# Patient Record
Sex: MALE | Race: WHITE | Employment: FULL TIME | URBAN - METROPOLITAN AREA
[De-identification: names, ages, dates, MRNs, and addresses within clinical notes are randomized per-mention and may not be internally consistent; named-entity substitution may affect disease eponyms.]

---

## 2023-01-19 ENCOUNTER — HOSPITAL ENCOUNTER (OUTPATIENT)
Age: 28
Setting detail: OUTPATIENT SURGERY
Discharge: HOME | End: 2023-01-19
Attending: INTERNAL MEDICINE | Admitting: INTERNAL MEDICINE
Payer: COMMERCIAL

## 2023-01-19 ENCOUNTER — ANESTHESIA (OUTPATIENT)
Dept: ENDOSCOPY | Age: 28
End: 2023-01-19
Payer: COMMERCIAL

## 2023-01-19 ENCOUNTER — ANESTHESIA EVENT (OUTPATIENT)
Dept: ENDOSCOPY | Age: 28
End: 2023-01-19
Payer: COMMERCIAL

## 2023-01-19 VITALS
SYSTOLIC BLOOD PRESSURE: 121 MMHG | RESPIRATION RATE: 16 BRPM | DIASTOLIC BLOOD PRESSURE: 74 MMHG | OXYGEN SATURATION: 98 % | HEART RATE: 76 BPM | HEIGHT: 76 IN | TEMPERATURE: 97.8 F | WEIGHT: 165 LBS | BODY MASS INDEX: 20.09 KG/M2

## 2023-01-19 DIAGNOSIS — R11.2 NAUSEA AND VOMITING, UNSPECIFIED VOMITING TYPE: ICD-10-CM

## 2023-01-19 PROCEDURE — 88342 IMHCHEM/IMCYTCHM 1ST ANTB: CPT

## 2023-01-19 PROCEDURE — 2580000003 HC RX 258: Performed by: FAMILY MEDICINE

## 2023-01-19 PROCEDURE — 3700000001 HC ADD 15 MINUTES (ANESTHESIA): Performed by: INTERNAL MEDICINE

## 2023-01-19 PROCEDURE — 3609012400 HC EGD TRANSORAL BIOPSY SINGLE/MULTIPLE: Performed by: INTERNAL MEDICINE

## 2023-01-19 PROCEDURE — 2580000003 HC RX 258: Performed by: NURSE ANESTHETIST, CERTIFIED REGISTERED

## 2023-01-19 PROCEDURE — 7100000010 HC PHASE II RECOVERY - FIRST 15 MIN: Performed by: INTERNAL MEDICINE

## 2023-01-19 PROCEDURE — 88312 SPECIAL STAINS GROUP 1: CPT

## 2023-01-19 PROCEDURE — 88305 TISSUE EXAM BY PATHOLOGIST: CPT

## 2023-01-19 PROCEDURE — 6360000002 HC RX W HCPCS: Performed by: NURSE ANESTHETIST, CERTIFIED REGISTERED

## 2023-01-19 PROCEDURE — 7100000011 HC PHASE II RECOVERY - ADDTL 15 MIN: Performed by: INTERNAL MEDICINE

## 2023-01-19 PROCEDURE — 3700000000 HC ANESTHESIA ATTENDED CARE: Performed by: INTERNAL MEDICINE

## 2023-01-19 PROCEDURE — 2500000003 HC RX 250 WO HCPCS: Performed by: NURSE ANESTHETIST, CERTIFIED REGISTERED

## 2023-01-19 PROCEDURE — 2709999900 HC NON-CHARGEABLE SUPPLY: Performed by: INTERNAL MEDICINE

## 2023-01-19 RX ORDER — LIDOCAINE HYDROCHLORIDE 20 MG/ML
INJECTION, SOLUTION EPIDURAL; INFILTRATION; INTRACAUDAL; PERINEURAL PRN
Status: DISCONTINUED | OUTPATIENT
Start: 2023-01-19 | End: 2023-01-19 | Stop reason: SDUPTHER

## 2023-01-19 RX ORDER — ACETAMINOPHEN 325 MG/1
650 TABLET ORAL EVERY 6 HOURS PRN
COMMUNITY

## 2023-01-19 RX ORDER — ONDANSETRON 2 MG/ML
INJECTION INTRAMUSCULAR; INTRAVENOUS PRN
Status: DISCONTINUED | OUTPATIENT
Start: 2023-01-19 | End: 2023-01-19 | Stop reason: SDUPTHER

## 2023-01-19 RX ORDER — DICYCLOMINE HCL 20 MG
20 TABLET ORAL EVERY 6 HOURS
COMMUNITY

## 2023-01-19 RX ORDER — SODIUM CHLORIDE, SODIUM LACTATE, POTASSIUM CHLORIDE, CALCIUM CHLORIDE 600; 310; 30; 20 MG/100ML; MG/100ML; MG/100ML; MG/100ML
INJECTION, SOLUTION INTRAVENOUS CONTINUOUS PRN
Status: DISCONTINUED | OUTPATIENT
Start: 2023-01-19 | End: 2023-01-19 | Stop reason: SDUPTHER

## 2023-01-19 RX ORDER — SODIUM CHLORIDE, SODIUM LACTATE, POTASSIUM CHLORIDE, CALCIUM CHLORIDE 600; 310; 30; 20 MG/100ML; MG/100ML; MG/100ML; MG/100ML
INJECTION, SOLUTION INTRAVENOUS CONTINUOUS
Status: DISCONTINUED | OUTPATIENT
Start: 2023-01-19 | End: 2023-01-19 | Stop reason: HOSPADM

## 2023-01-19 RX ORDER — PROPOFOL 10 MG/ML
INJECTION, EMULSION INTRAVENOUS PRN
Status: DISCONTINUED | OUTPATIENT
Start: 2023-01-19 | End: 2023-01-19 | Stop reason: SDUPTHER

## 2023-01-19 RX ADMIN — SODIUM CHLORIDE, POTASSIUM CHLORIDE, SODIUM LACTATE AND CALCIUM CHLORIDE: 600; 310; 30; 20 INJECTION, SOLUTION INTRAVENOUS at 11:58

## 2023-01-19 RX ADMIN — PROPOFOL 100 MG: 10 INJECTION, EMULSION INTRAVENOUS at 12:24

## 2023-01-19 RX ADMIN — PROPOFOL 100 MG: 10 INJECTION, EMULSION INTRAVENOUS at 12:22

## 2023-01-19 RX ADMIN — ONDANSETRON 4 MG: 2 INJECTION INTRAMUSCULAR; INTRAVENOUS at 12:18

## 2023-01-19 RX ADMIN — PROPOFOL 100 MG: 10 INJECTION, EMULSION INTRAVENOUS at 12:26

## 2023-01-19 RX ADMIN — PROPOFOL 100 MG: 10 INJECTION, EMULSION INTRAVENOUS at 12:21

## 2023-01-19 RX ADMIN — LIDOCAINE HYDROCHLORIDE 100 MG: 20 INJECTION, SOLUTION EPIDURAL; INFILTRATION; INTRACAUDAL; PERINEURAL at 12:21

## 2023-01-19 RX ADMIN — SODIUM CHLORIDE, SODIUM LACTATE, POTASSIUM CHLORIDE, AND CALCIUM CHLORIDE: .6; .31; .03; .02 INJECTION, SOLUTION INTRAVENOUS at 12:15

## 2023-01-19 ASSESSMENT — PAIN - FUNCTIONAL ASSESSMENT: PAIN_FUNCTIONAL_ASSESSMENT: 0-10

## 2023-01-19 ASSESSMENT — PAIN SCALES - GENERAL: PAINLEVEL_OUTOF10: 0

## 2023-01-19 NOTE — PROGRESS NOTES
Sleepy on return from EGD. Dr Satish Villarreal with parents for at least 16 mins explaining procedure and next steps.

## 2023-01-19 NOTE — ANESTHESIA PRE PROCEDURE
Department of Anesthesiology  Preprocedure Note       Name:  Mark Hernández   Age:  32 y.o.  :  1995                                          MRN:  [id number]         Date:  2023      Surgeon: Jamila Maurice):  Kwame Pacheco MD    Procedure: Procedure(s):  EGD BIOPSY    Medications prior to admission:   Prior to Admission medications    Medication Sig Start Date End Date Taking? Authorizing Provider   PANTOPRAZOLE SODIUM PO Take 40 mg by mouth daily   Yes Historical Provider, MD   acetaminophen (TYLENOL) 325 MG tablet Take 650 mg by mouth every 6 hours as needed for Pain 2 tabs   Yes Historical Provider, MD   dicyclomine (BENTYL) 20 MG tablet Take 20 mg by mouth in the morning and 20 mg at noon and 20 mg in the evening and 20 mg before bedtime. Yes Historical Provider, MD       Current medications:    Current Facility-Administered Medications   Medication Dose Route Frequency Provider Last Rate Last Admin    lactated ringers infusion   IntraVENous Continuous Claire Mir  mL/hr at 23 1158 New Bag at 23 1158       Allergies:  No Known Allergies    Problem List:  There is no problem list on file for this patient.       Past Medical History:        Diagnosis Date    Prolonged emergence from general anesthesia     post appendectomy    Retention of urine     after appendectomy- required cather       Past Surgical History:        Procedure Laterality Date    APPENDECTOMY  2023    CYST REMOVAL Right 10/17/2016    Excision nevis R Chest, Sebaceous cyst scrotum       Social History:    Social History     Tobacco Use    Smoking status: Some Days     Packs/day: 0.33     Types: Cigarettes    Smokeless tobacco: Not on file    Tobacco comments:     2 packs/week   Substance Use Topics    Alcohol use: Not Currently                                Ready to quit: Yes  Counseling given: Yes  Tobacco comments: 2 packs/week      Vital Signs (Current):   Vitals:    23 1250 23 1300 01/19/23 1310 01/19/23 1320   BP: 111/69 120/73 123/75 121/74   Pulse: 86 82 79 76   Resp: 16 16 16 16   Temp:       TempSrc:       SpO2: 100% 98% 98% 98%   Weight:       Height:                                                  BP Readings from Last 3 Encounters:   01/19/23 121/74   10/17/16 132/73       NPO Status: Time of last liquid consumption: 2230                        Time of last solid consumption: 1200                        Date of last liquid consumption: 01/18/23                        Date of last solid food consumption: 01/18/23    BMI:   Wt Readings from Last 3 Encounters:   01/19/23 165 lb (74.8 kg)   10/17/16 180 lb (81.6 kg)     Body mass index is 20.08 kg/m². CBC: No results found for: WBC, RBC, HGB, HCT, MCV, RDW, PLT    CMP: No results found for: NA, K, CL, CO2, BUN, CREATININE, GFRAA, AGRATIO, LABGLOM, GLUCOSE, GLU, PROT, CALCIUM, BILITOT, ALKPHOS, AST, ALT    POC Tests: No results for input(s): POCGLU, POCNA, POCK, POCCL, POCBUN, POCHEMO, POCHCT in the last 72 hours. Coags: No results found for: PROTIME, INR, APTT    HCG (If Applicable): No results found for: PREGTESTUR, PREGSERUM, HCG, HCGQUANT     ABGs: No results found for: PHART, PO2ART, FFH1HFY, DXG2TUR, BEART, S3XKXCVX     Type & Screen (If Applicable):  No results found for: LABABO, LABRH    Drug/Infectious Status (If Applicable):  No results found for: HIV, HEPCAB    COVID-19 Screening (If Applicable): No results found for: COVID19        Anesthesia Evaluation    Airway: Mallampati: II  TM distance: >3 FB   Neck ROM: full  Mouth opening: > = 3 FB   Dental:          Pulmonary:                              Cardiovascular:            Rhythm: regular  Rate: normal                    Neuro/Psych:               GI/Hepatic/Renal:             Endo/Other:                     Abdominal:             Vascular: Other Findings:           Anesthesia Plan      MAC     ASA 2       Induction: intravenous.       Anesthetic plan and risks discussed with patient. Plan discussed with CRNA.                     Bhumika Tilley MD   1/19/2023

## 2023-01-19 NOTE — DISCHARGE INSTRUCTIONS
ENDOSCOPY DISCHARGE INSTRUCTIONS:    Call the physician that did your procedure for any questions or concern:    [facility]: [phone]  DR. CATALINA CEDENO      ACTIVITY:    There are potential side effects to the medications used for sedation and anesthesia during your procedure. These include:  Dizziness or light-headedness, confusion or memory loss, delayed reaction times, loss of coordination, nausea and vomiting. Because of your increased risk for injury, we ask that you observe the following precautions: For the next 24 hours,  DO NOT operate an automobile, bicycle, motorcycle, , power tools or large equipment of any kind. Do not drink alcohol, sign any legal documents or make any legal decisions for 24 hours. Do not bend your head over lower than your heart. DO sit on the side of bed/couch awhile before getting up. Plan on bedrest or quiet relaxation today. You may resume normal activities in 24 hours. DIET:    Your first meal today should be light, avoiding spicy and fatty foods. If you tolerate this first meal, then you may advance to your regular diet unless otherwise advised by your physician. NORMAL SYMPTOMS:  -Mild sore throat if youve had an EGD   -Gaseous discomfort    NOTIFY YOUR PHYSICIAN IF THESE SYMPTOMS OCCUR:  1. Fever (greater than 100)  5. Increased abdominal bloating  2. Severe pain    6. Excessive bleeding  3. Nausea and vomiting  7. Chest pain                                                                    4. Chills    8. Shortness of breath    ADDITIONAL INSTRUCTIONS:    Biopsy results: Call [phone],[facility] for biopsy results in 1 week    Educational Information:    Findings: The esophagus is notable for 2 small erosions biopsied. The junction, squamocolumnar line and diaphragm are all at the same level. In the stomach, the cardia, fundus and gastric body are essentially unremarkable. In the antrum, there were scattered small erosions without any bleeding. Biopsies were obtained from the antrum, incisura and lesser and greater curvature of the gastric body to rule out H. pylori and gastric Crohn's disease. In the duodenum, the first and second portions were unremarkable. Biopsies were obtained from the second portion to rule out celiac sprue and occult IBD. PLAN:    -Continue plan as outlined yesterday:  -Protonix daily before breakfast, Librax 3 times a day, Zofran ODT  -Colonoscopy likely on January 31, which is essentially the earliest we can perform it safely after appendectomy.  -awaiting stool studies         Please review these discharge instructions this evening or tomorrow for  information you may have forgotten. We want to thank you for choosing the [facility] as your health care provider. We always strive to provide you with excellent care while you are here. You may receive a survey in the mail regarding your care. We would appreciate you taking a few minutes of your time to complete this survey.

## 2023-01-19 NOTE — PROGRESS NOTES
Discharge instructions reviewed with patient/responsible adult and understanding verbalized. Discharge instructions signed and copies given. Patient discharged home with belongings. Pt left amb. after voiding to go home with his wife. No c/o . Chelsey Gonzalez Wife took DC paper home  after signing it and reviewed.

## 2023-01-19 NOTE — PROCEDURES
EGD PROCEDURE NOTE         Esophagogastroduodenoscopy Procedure Note     Patient: Sharyle Batter MRN: [id number]   YOB: 1995 Age: 32 y.o. Sex: male   Unit: [address] [place] [address] ENDOSCOPY Room/Bed: Endo Pool/NONE Location: [address] [place] [address]    Admitting Physician: Preston Varma     Primary Care Physician: Silvano Najera MD      DATE OF PROCEDURE: 1/19/2023  PROCEDURE: Esophagogastroduodenoscopy  INDICATION:    Diagnostic EGD for nausea vomiting, subacute mid and lower abdominal pain, diarrhea. 79-year-old man with 4 to 6 weeks of what started off his mid and lower abdominal pain, diarrhea, intractable nausea vomiting 1/8/2023, went to [facility] ER, CT scan suggested mild early acute appendicitis, and appendectomy was performed, with similar findings. Pathology suggested some chronic architectural changes on the mucosa of the appendix, raising specter of IBD. Blood work so far including CBC, CMP, lipase on several occasions unremarkable. CRP was 55. Stool infectious studies initially unremarkable. In the last 2 weeks since appendectomy, he has had overall less pain, but is having severe cramping lower abdominal pain prior to bowel movement, then watery diarrhea with small amounts of red color. Based on the shift to diarrhea, repeat stool infectious inflammatory studies were ordered yesterday. I did call his surgeon to discuss the soonest we can do colonoscopy because of the appendectomy, and the consensus was 3 weeks is the minimum, which would be January 31, 2023. We are trying to work on scheduling that. We can however proceed with an EGD for now. C diff yesterday neg. All other stool studies pending.     ENDOSCOPIST: Daniella Dacosta MD    POSTOPERATIVE DIAGNOSIS:    -Esophagus with 2 tiny erosions in the distal and mid esophagus, biopsied with intention of ruling out Crohn's.  -Stomach with scattered small erosions in the antrum, consistent with mild ulcerative antritis, biopsied in the antrum, incisura, lesser and greater curvature to rule out H. pylori and also gastric Crohn's  -Duodenum unremarkable to the distal second portion, with biopsies x8 in the second portion to rule out celiac sprue and occult IBD. These findings by themselves do not fully explain his symptoms but they may be part of the puzzle. PLAN:    -Continue plan as outlined yesterday:  -Protonix daily before breakfast, Librax 3 times a day, Zofran ODT  -Colonoscopy likely on January 31, which is essentially the earliest we can perform it safely after appendectomy.  -awaiting stool studies    INFORMED CONSENT:  Informed consent for esophagogastoduodenoscopy was obtained. The benefits and risks including adverse medicine reaction have been explained. The patient's questions were answered and the patient agreed to proceed. ASA:  ASA 2 - Patient with mild systemic disease with no functional limitations    SEDATION: MAC     The patient's vital signs, cardiac status, pulmonary status, abdominal status and mental status were stable for the procedure. The patient's vitals signs and respiratory function as monitored by oxygen saturation were stable throughout    Procedure Details: The Olympus videoendoscope was inserted into the mouth and carefully passed into the esophagus, through the stomach and to the distal duodenum. Antegrade and retrograde examination of the upper gi tract was carefully performed. Findings: The esophagus is notable for 2 small erosions biopsied. The junction, squamocolumnar line and diaphragm are all at the same level. In the stomach, the cardia, fundus and gastric body are essentially unremarkable. In the antrum, there were scattered small erosions without any bleeding. Biopsies were obtained from the antrum, incisura and lesser and greater curvature of the gastric body to rule out H. pylori and gastric Crohn's disease. In the duodenum, the first and second portions were unremarkable. Biopsies were obtained from the second portion to rule out celiac sprue and occult IBD.     Gastric or Duodenal ulcer present: No    Estimated Blood Loss: Minimal      Signed By: Erik Esparza MD

## 2023-01-19 NOTE — ANESTHESIA POSTPROCEDURE EVALUATION
Department of Anesthesiology  Postprocedure Note    Patient: Bari Skiff  MRN: [id number]  YOB: 1995  Date of evaluation: 1/19/2023      Procedure Summary     Date: 01/19/23 Room / Location: [place] [address] / [facility]    Anesthesia Start: 1215 Anesthesia Stop: 7631    Procedure: EGD BIOPSY Diagnosis:       Nausea and vomiting, unspecified vomiting type      (Nausea and vomiting, unspecified vomiting type [R11.2])    Surgeons: Usha Lombardi MD Responsible Provider: Jimmy Norwood MD    Anesthesia Type: MAC ASA Status: 2          Anesthesia Type: No value filed.     Romaine Phase I: Romaine Score: 10    Romaine Phase II: Romaine Score: 8      Anesthesia Post Evaluation    Patient location during evaluation: PACU  Level of consciousness: awake  Complications: no  Multimodal analgesia pain management approach

## 2023-01-19 NOTE — H&P
Gastroenterology Outpatient History and Physical     Patient: Yuki Lau MRN: [id number] Sex: male   YOB: 1995 Age: 32 y.o. Location: [address] [place] [address]    Date:1/19/2023  Primary Care Physician: Joann Berumen MD         Patient: Yuki [facility]    Physician: Laurent Segundo MD    History of Present Illness: Diagnostic EGD for nausea vomiting, subacute mid and lower abdominal pain, diarrhea. 26-year-old man with 4 to 6 weeks of what started off his mid and lower abdominal pain, diarrhea, intractable nausea vomiting 1/8/2023, went to [facility] ER, CT scan suggested mild early acute appendicitis, and appendectomy was performed, with similar findings. Pathology suggested some chronic architectural changes on the mucosa of the appendix, raising specter of IBD. Blood work so far including CBC, CMP, lipase on several occasions unremarkable. CRP was 55. Stool infectious studies initially unremarkable. In the last 2 weeks since appendectomy, he has had overall less pain, but is having severe cramping lower abdominal pain prior to bowel movement, then watery diarrhea with small amounts of red color. Based on the shift to diarrhea, repeat stool infectious inflammatory studies were ordered yesterday. I did call his surgeon to discuss the soonest we can do colonoscopy because of the appendectomy, and the consensus was 3 weeks is the minimum, which would be January 31, 2023. We are trying to work on scheduling that. We can however proceed with an EGD for now. C diff yesterday neg. All other stool studies pending.       Review of Systems:  Weight Loss: No  Dysphagia: No  Dyspepsia: No  History:  Past Medical History:   Diagnosis Date    Prolonged emergence from general anesthesia     Retention of urine       Past Surgical History:   Procedure Laterality Date    APPENDECTOMY  01/08/2023    CYST REMOVAL Right 10/17/2016    Excision nevis R Chest, Sebaceous cyst scrotum      Social History     Socioeconomic History    Marital status: Single     Spouse name: None    Number of children: None    Years of education: None    Highest education level: None   Tobacco Use    Smoking status: Some Days     Types: Cigarettes    Tobacco comments:     2 packs/week   Substance and Sexual Activity    Alcohol use: Not Currently    Drug use: Yes     Types: Marijuana (Weed)     Comment: not x 1 month      History reviewed. No pertinent family history. Allergies: No Known Allergies  Medications:   Prior to Admission medications    Medication Sig Start Date End Date Taking? Authorizing Provider   PANTOPRAZOLE SODIUM PO Take 40 mg by mouth daily   Yes Historical Provider, MD   acetaminophen (TYLENOL) 325 MG tablet Take 650 mg by mouth every 6 hours as needed for Pain 2 tabs   Yes Historical Provider, MD   dicyclomine (BENTYL) 20 MG tablet Take 20 mg by mouth in the morning and 20 mg at noon and 20 mg in the evening and 20 mg before bedtime. Yes Historical Provider, MD       Vital Signs: /76   Pulse 83   Temp 97.9 °F (36.6 °C) (Temporal)   Resp 16   Ht 6' 4\" (1.93 m)   Wt 165 lb (74.8 kg)   SpO2 100%   BMI 20.08 kg/m²   Physical Exam:   Heart: regular   Lungs: non-labored breathing  Mental status:  Alert and oriented    ASA score:  ASA 2 - Patient with mild systemic disease with no functional limitations{  Mallimpati score:  2     Planned Procedure: EGD    Planned Sedation: Conscious sedation / Monitored anesthesia.     Signed By: Michelel Jones MD   January 19, 2023

## 2023-01-19 NOTE — PROGRESS NOTES
PT is sleeping color pale pink. Mom tearful and concerned about her son's vomiting/diarrhea and abd pains. Vss. DC instructions already reivewed with pt 's parents.

## (undated) DEVICE — CANNULA SAMP CO2 AD GRN 7FT CO2 AND 7FT O2 TBNG UNIV CONN

## (undated) DEVICE — FORCEPS BX L240CM JAW DIA2.8MM L CAP W/ NDL MIC MESH TOOTH